# Patient Record
Sex: MALE | URBAN - METROPOLITAN AREA
[De-identification: names, ages, dates, MRNs, and addresses within clinical notes are randomized per-mention and may not be internally consistent; named-entity substitution may affect disease eponyms.]

---

## 2024-06-04 ENCOUNTER — OFFICE VISIT (OUTPATIENT)
Dept: URBAN - METROPOLITAN AREA CLINIC 82 | Facility: CLINIC | Age: 39
End: 2024-06-04

## 2024-06-18 ENCOUNTER — LAB OUTSIDE AN ENCOUNTER (OUTPATIENT)
Dept: URBAN - METROPOLITAN AREA CLINIC 82 | Facility: CLINIC | Age: 39
End: 2024-06-18

## 2024-06-18 ENCOUNTER — DASHBOARD ENCOUNTERS (OUTPATIENT)
Age: 39
End: 2024-06-18

## 2024-06-18 ENCOUNTER — OFFICE VISIT (OUTPATIENT)
Dept: URBAN - METROPOLITAN AREA CLINIC 82 | Facility: CLINIC | Age: 39
End: 2024-06-18
Payer: COMMERCIAL

## 2024-06-18 VITALS
WEIGHT: 157.6 LBS | SYSTOLIC BLOOD PRESSURE: 137 MMHG | BODY MASS INDEX: 25.33 KG/M2 | HEART RATE: 54 BPM | TEMPERATURE: 98.6 F | HEIGHT: 66 IN | DIASTOLIC BLOOD PRESSURE: 82 MMHG

## 2024-06-18 DIAGNOSIS — K57.30 DIVERTICULOSIS: ICD-10-CM

## 2024-06-18 DIAGNOSIS — R93.3 ABNORMAL CT SCAN, SIGMOID COLON: ICD-10-CM

## 2024-06-18 DIAGNOSIS — Z80.0 FAMILY HISTORY OF MALIGNANT NEOPLASM OF COLON IN RELATIVE DIAGNOSED WHEN OLDER THAN 50 YEARS OF AGE: ICD-10-CM

## 2024-06-18 PROBLEM — 397881000: Status: ACTIVE | Noted: 2024-06-18

## 2024-06-18 PROBLEM — 129679001: Status: ACTIVE | Noted: 2024-06-18

## 2024-06-18 PROBLEM — 154051000119101: Status: ACTIVE | Noted: 2024-06-18

## 2024-06-18 PROCEDURE — 99243 OFF/OP CNSLTJ NEW/EST LOW 30: CPT | Performed by: STUDENT IN AN ORGANIZED HEALTH CARE EDUCATION/TRAINING PROGRAM

## 2024-06-18 PROCEDURE — 99203 OFFICE O/P NEW LOW 30 MIN: CPT | Performed by: STUDENT IN AN ORGANIZED HEALTH CARE EDUCATION/TRAINING PROGRAM

## 2024-06-18 NOTE — HPI-TODAY'S VISIT:
39 y.o male was referred to our office by Dr. Elizabeth Motley for evaluation diverticulosis of large intestine . A copy of this note and recommendations will be sent to the referring provider's office.   Patient states that he had a CT scan for purpose of kidney stones, incidentally noted diverticulosis and sigmoid bowel wall thickening. Recent labs on 04/2024 w PCP reviewed, CBC/CMP wnl. He admits that M-gma and P-gpa was dx w CRC. At this visit, patient states that overall doing well. No abd pain, NV. He was dealing w loose stool but recently improved when patient cut back on sugar/coffee products. BM: 2-3x per day. No melena or hematochezia.